# Patient Record
Sex: FEMALE | Race: WHITE | NOT HISPANIC OR LATINO | Employment: UNEMPLOYED | URBAN - METROPOLITAN AREA
[De-identification: names, ages, dates, MRNs, and addresses within clinical notes are randomized per-mention and may not be internally consistent; named-entity substitution may affect disease eponyms.]

---

## 2020-02-13 ENCOUNTER — HOSPITAL ENCOUNTER (EMERGENCY)
Facility: MEDICAL CENTER | Age: 25
End: 2020-02-13
Payer: MEDICAID

## 2020-02-13 VITALS
SYSTOLIC BLOOD PRESSURE: 139 MMHG | WEIGHT: 266.76 LBS | HEIGHT: 62 IN | BODY MASS INDEX: 49.09 KG/M2 | RESPIRATION RATE: 18 BRPM | OXYGEN SATURATION: 99 % | DIASTOLIC BLOOD PRESSURE: 99 MMHG | HEART RATE: 118 BPM | TEMPERATURE: 96.4 F

## 2020-02-13 PROCEDURE — 302449 STATCHG TRIAGE ONLY (STATISTIC)

## 2020-02-14 NOTE — ED NOTES
Patient expressed desire to leave AMA. She plans on returning tomorrow. Discussed risks of leaving and pt sign AMA form. Will dismiss.

## 2020-02-14 NOTE — ED TRIAGE NOTES
Chief Complaint   Patient presents with   • Abdominal Pain     c/o low abd pain x 3 hrs, hx of ovarian cysts. + n/v x 4 today. Denies diarrhea. Pt reports difficulty urinating, denies vaginal discharge or bleeding.      Pt ambulatory to triage foe above complaints. Instructed on clean catch urine collection. Educated on triage process, encouraged to inform staff off any changes.

## 2020-04-21 ENCOUNTER — APPOINTMENT (OUTPATIENT)
Dept: ADMISSIONS | Facility: MEDICAL CENTER | Age: 25
End: 2020-04-21
Payer: MEDICAID

## 2020-04-23 NOTE — OR NURSING
1. Caller Name: Mely Kellogg                        Call Back Number: [id number]  [facility] PCP or Specialty Provider: No            2.  Does patient have any active symptoms of respiratory illness (fever OR cough OR shortness of breath OR sore throat)? No.    3.  Does patient have any comoribidities? None     4.  Has the patient traveled in the last 14 days OR had any known contact with someone who is suspected or confirmed to have COVID-19?  No.    5. Disposition: Cleared by RN Triage; OK to keep/schedule appointment    Note routed to [facility] Provider: ARIANNA only.

## 2020-04-24 ENCOUNTER — HOSPITAL ENCOUNTER (OUTPATIENT)
Facility: MEDICAL CENTER | Age: 25
End: 2020-04-24
Attending: OBSTETRICS & GYNECOLOGY | Admitting: OBSTETRICS & GYNECOLOGY
Payer: MEDICAID

## 2020-04-24 ENCOUNTER — ANESTHESIA EVENT (OUTPATIENT)
Dept: SURGERY | Facility: MEDICAL CENTER | Age: 25
End: 2020-04-24
Payer: MEDICAID

## 2020-04-24 ENCOUNTER — ANESTHESIA (OUTPATIENT)
Dept: SURGERY | Facility: MEDICAL CENTER | Age: 25
End: 2020-04-24
Payer: MEDICAID

## 2020-04-24 VITALS
RESPIRATION RATE: 18 BRPM | WEIGHT: 257.94 LBS | HEART RATE: 100 BPM | DIASTOLIC BLOOD PRESSURE: 61 MMHG | TEMPERATURE: 98.1 F | HEIGHT: 57 IN | SYSTOLIC BLOOD PRESSURE: 112 MMHG | OXYGEN SATURATION: 96 % | BODY MASS INDEX: 55.65 KG/M2

## 2020-04-24 DIAGNOSIS — G89.18 POSTOPERATIVE PAIN: ICD-10-CM

## 2020-04-24 LAB
EKG IMPRESSION: NORMAL
PATHOLOGY CONSULT NOTE: NORMAL

## 2020-04-24 PROCEDURE — 500868 HCHG NEEDLE, SURGI(VARES): Performed by: OBSTETRICS & GYNECOLOGY

## 2020-04-24 PROCEDURE — 160035 HCHG PACU - 1ST 60 MINS PHASE I: Performed by: OBSTETRICS & GYNECOLOGY

## 2020-04-24 PROCEDURE — 88305 TISSUE EXAM BY PATHOLOGIST: CPT | Mod: 59

## 2020-04-24 PROCEDURE — 700101 HCHG RX REV CODE 250: Performed by: ANESTHESIOLOGY

## 2020-04-24 PROCEDURE — 700105 HCHG RX REV CODE 258: Performed by: OBSTETRICS & GYNECOLOGY

## 2020-04-24 PROCEDURE — 700101 HCHG RX REV CODE 250: Performed by: OBSTETRICS & GYNECOLOGY

## 2020-04-24 PROCEDURE — 93005 ELECTROCARDIOGRAM TRACING: CPT | Performed by: OBSTETRICS & GYNECOLOGY

## 2020-04-24 PROCEDURE — 502779 HCHG SUTURE, QUILL: Performed by: OBSTETRICS & GYNECOLOGY

## 2020-04-24 PROCEDURE — A9270 NON-COVERED ITEM OR SERVICE: HCPCS | Performed by: ANESTHESIOLOGY

## 2020-04-24 PROCEDURE — 160046 HCHG PACU - 1ST 60 MINS PHASE II: Performed by: OBSTETRICS & GYNECOLOGY

## 2020-04-24 PROCEDURE — 160036 HCHG PACU - EA ADDL 30 MINS PHASE I: Performed by: OBSTETRICS & GYNECOLOGY

## 2020-04-24 PROCEDURE — 700111 HCHG RX REV CODE 636 W/ 250 OVERRIDE (IP): Performed by: ANESTHESIOLOGY

## 2020-04-24 PROCEDURE — 93010 ELECTROCARDIOGRAM REPORT: CPT | Performed by: INTERNAL MEDICINE

## 2020-04-24 PROCEDURE — 700102 HCHG RX REV CODE 250 W/ 637 OVERRIDE(OP): Performed by: ANESTHESIOLOGY

## 2020-04-24 PROCEDURE — 700102 HCHG RX REV CODE 250 W/ 637 OVERRIDE(OP): Performed by: OBSTETRICS & GYNECOLOGY

## 2020-04-24 PROCEDURE — 160002 HCHG RECOVERY MINUTES (STAT): Performed by: OBSTETRICS & GYNECOLOGY

## 2020-04-24 PROCEDURE — 502714 HCHG ROBOTIC SURGERY SERVICES: Performed by: OBSTETRICS & GYNECOLOGY

## 2020-04-24 PROCEDURE — 160009 HCHG ANES TIME/MIN: Performed by: OBSTETRICS & GYNECOLOGY

## 2020-04-24 PROCEDURE — A9270 NON-COVERED ITEM OR SERVICE: HCPCS | Performed by: OBSTETRICS & GYNECOLOGY

## 2020-04-24 PROCEDURE — 500854 HCHG NEEDLE, INSUFFLATION FOR STEP: Performed by: OBSTETRICS & GYNECOLOGY

## 2020-04-24 PROCEDURE — 88112 CYTOPATH CELL ENHANCE TECH: CPT

## 2020-04-24 PROCEDURE — 160042 HCHG SURGERY MINUTES - EA ADDL 1 MIN LEVEL 5: Performed by: OBSTETRICS & GYNECOLOGY

## 2020-04-24 PROCEDURE — 160025 RECOVERY II MINUTES (STATS): Performed by: OBSTETRICS & GYNECOLOGY

## 2020-04-24 PROCEDURE — 501838 HCHG SUTURE GENERAL: Performed by: OBSTETRICS & GYNECOLOGY

## 2020-04-24 PROCEDURE — 160031 HCHG SURGERY MINUTES - 1ST 30 MINS LEVEL 5: Performed by: OBSTETRICS & GYNECOLOGY

## 2020-04-24 PROCEDURE — 160048 HCHG OR STATISTICAL LEVEL 1-5: Performed by: OBSTETRICS & GYNECOLOGY

## 2020-04-24 RX ORDER — ACETAMINOPHEN 500 MG
1000 TABLET ORAL ONCE
Status: COMPLETED | OUTPATIENT
Start: 2020-04-24 | End: 2020-04-24

## 2020-04-24 RX ORDER — LORAZEPAM 2 MG/ML
0.5 INJECTION INTRAMUSCULAR
Status: DISCONTINUED | OUTPATIENT
Start: 2020-04-24 | End: 2020-04-24 | Stop reason: HOSPADM

## 2020-04-24 RX ORDER — DIPHENHYDRAMINE HYDROCHLORIDE 50 MG/ML
12.5 INJECTION INTRAMUSCULAR; INTRAVENOUS
Status: DISCONTINUED | OUTPATIENT
Start: 2020-04-24 | End: 2020-04-24 | Stop reason: HOSPADM

## 2020-04-24 RX ORDER — GABAPENTIN 300 MG/1
600 CAPSULE ORAL ONCE
Status: COMPLETED | OUTPATIENT
Start: 2020-04-24 | End: 2020-04-24

## 2020-04-24 RX ORDER — LIDOCAINE HYDROCHLORIDE 20 MG/ML
INJECTION, SOLUTION EPIDURAL; INFILTRATION; INTRACAUDAL; PERINEURAL PRN
Status: DISCONTINUED | OUTPATIENT
Start: 2020-04-24 | End: 2020-04-24 | Stop reason: SURG

## 2020-04-24 RX ORDER — OXYCODONE HCL 5 MG/5 ML
10 SOLUTION, ORAL ORAL
Status: COMPLETED | OUTPATIENT
Start: 2020-04-24 | End: 2020-04-24

## 2020-04-24 RX ORDER — BUPIVACAINE HYDROCHLORIDE AND EPINEPHRINE 2.5; 5 MG/ML; UG/ML
INJECTION, SOLUTION EPIDURAL; INFILTRATION; INTRACAUDAL; PERINEURAL
Status: DISCONTINUED | OUTPATIENT
Start: 2020-04-24 | End: 2020-04-24 | Stop reason: HOSPADM

## 2020-04-24 RX ORDER — HYDRALAZINE HYDROCHLORIDE 20 MG/ML
5 INJECTION INTRAMUSCULAR; INTRAVENOUS
Status: DISCONTINUED | OUTPATIENT
Start: 2020-04-24 | End: 2020-04-24 | Stop reason: HOSPADM

## 2020-04-24 RX ORDER — LABETALOL HYDROCHLORIDE 5 MG/ML
5 INJECTION, SOLUTION INTRAVENOUS
Status: DISCONTINUED | OUTPATIENT
Start: 2020-04-24 | End: 2020-04-24 | Stop reason: HOSPADM

## 2020-04-24 RX ORDER — DEXAMETHASONE SODIUM PHOSPHATE 4 MG/ML
INJECTION, SOLUTION INTRA-ARTICULAR; INTRALESIONAL; INTRAMUSCULAR; INTRAVENOUS; SOFT TISSUE PRN
Status: DISCONTINUED | OUTPATIENT
Start: 2020-04-24 | End: 2020-04-24 | Stop reason: SURG

## 2020-04-24 RX ORDER — ONDANSETRON 4 MG/1
4 TABLET, FILM COATED ORAL EVERY 4 HOURS PRN
Qty: 20 TAB | Refills: 0
Start: 2020-04-24

## 2020-04-24 RX ORDER — HYDROMORPHONE HYDROCHLORIDE 1 MG/ML
0.4 INJECTION, SOLUTION INTRAMUSCULAR; INTRAVENOUS; SUBCUTANEOUS
Status: DISCONTINUED | OUTPATIENT
Start: 2020-04-24 | End: 2020-04-24 | Stop reason: HOSPADM

## 2020-04-24 RX ORDER — MAGNESIUM HYDROXIDE 1200 MG/15ML
LIQUID ORAL
Status: DISCONTINUED | OUTPATIENT
Start: 2020-04-24 | End: 2020-04-24 | Stop reason: HOSPADM

## 2020-04-24 RX ORDER — HYDROMORPHONE HYDROCHLORIDE 1 MG/ML
0.2 INJECTION, SOLUTION INTRAMUSCULAR; INTRAVENOUS; SUBCUTANEOUS
Status: DISCONTINUED | OUTPATIENT
Start: 2020-04-24 | End: 2020-04-24 | Stop reason: HOSPADM

## 2020-04-24 RX ORDER — SODIUM CHLORIDE, SODIUM LACTATE, POTASSIUM CHLORIDE, CALCIUM CHLORIDE 600; 310; 30; 20 MG/100ML; MG/100ML; MG/100ML; MG/100ML
INJECTION, SOLUTION INTRAVENOUS CONTINUOUS
Status: DISCONTINUED | OUTPATIENT
Start: 2020-04-24 | End: 2020-04-24 | Stop reason: HOSPADM

## 2020-04-24 RX ORDER — SUCCINYLCHOLINE/SOD CL,ISO/PF 200MG/10ML
SYRINGE (ML) INTRAVENOUS PRN
Status: DISCONTINUED | OUTPATIENT
Start: 2020-04-24 | End: 2020-04-24 | Stop reason: SURG

## 2020-04-24 RX ORDER — MEPERIDINE HYDROCHLORIDE 25 MG/ML
6.25 INJECTION INTRAMUSCULAR; INTRAVENOUS; SUBCUTANEOUS
Status: DISCONTINUED | OUTPATIENT
Start: 2020-04-24 | End: 2020-04-24 | Stop reason: HOSPADM

## 2020-04-24 RX ORDER — ROCURONIUM BROMIDE 10 MG/ML
INJECTION, SOLUTION INTRAVENOUS PRN
Status: DISCONTINUED | OUTPATIENT
Start: 2020-04-24 | End: 2020-04-24 | Stop reason: SURG

## 2020-04-24 RX ORDER — CELECOXIB 200 MG/1
400 CAPSULE ORAL ONCE
Status: COMPLETED | OUTPATIENT
Start: 2020-04-24 | End: 2020-04-24

## 2020-04-24 RX ORDER — SODIUM CHLORIDE, SODIUM LACTATE, POTASSIUM CHLORIDE, CALCIUM CHLORIDE 600; 310; 30; 20 MG/100ML; MG/100ML; MG/100ML; MG/100ML
INJECTION, SOLUTION INTRAVENOUS
Status: COMPLETED | OUTPATIENT
Start: 2020-04-24 | End: 2020-04-24

## 2020-04-24 RX ORDER — OXYCODONE HYDROCHLORIDE AND ACETAMINOPHEN 5; 325 MG/1; MG/1
1-2 TABLET ORAL EVERY 4 HOURS PRN
Qty: 28 TAB | Refills: 0
Start: 2020-04-24 | End: 2020-05-01

## 2020-04-24 RX ORDER — HALOPERIDOL 5 MG/ML
1 INJECTION INTRAMUSCULAR
Status: DISCONTINUED | OUTPATIENT
Start: 2020-04-24 | End: 2020-04-24 | Stop reason: HOSPADM

## 2020-04-24 RX ORDER — OXYCODONE HCL 5 MG/5 ML
5 SOLUTION, ORAL ORAL
Status: COMPLETED | OUTPATIENT
Start: 2020-04-24 | End: 2020-04-24

## 2020-04-24 RX ORDER — HYDROMORPHONE HYDROCHLORIDE 1 MG/ML
0.1 INJECTION, SOLUTION INTRAMUSCULAR; INTRAVENOUS; SUBCUTANEOUS
Status: DISCONTINUED | OUTPATIENT
Start: 2020-04-24 | End: 2020-04-24 | Stop reason: HOSPADM

## 2020-04-24 RX ORDER — DEXMEDETOMIDINE HYDROCHLORIDE 100 UG/ML
INJECTION, SOLUTION INTRAVENOUS PRN
Status: DISCONTINUED | OUTPATIENT
Start: 2020-04-24 | End: 2020-04-24 | Stop reason: SURG

## 2020-04-24 RX ORDER — ONDANSETRON 2 MG/ML
INJECTION INTRAMUSCULAR; INTRAVENOUS PRN
Status: DISCONTINUED | OUTPATIENT
Start: 2020-04-24 | End: 2020-04-24 | Stop reason: SURG

## 2020-04-24 RX ORDER — ONDANSETRON 2 MG/ML
4 INJECTION INTRAMUSCULAR; INTRAVENOUS
Status: DISCONTINUED | OUTPATIENT
Start: 2020-04-24 | End: 2020-04-24 | Stop reason: HOSPADM

## 2020-04-24 RX ORDER — HYDROMORPHONE HYDROCHLORIDE 2 MG/ML
INJECTION, SOLUTION INTRAMUSCULAR; INTRAVENOUS; SUBCUTANEOUS PRN
Status: DISCONTINUED | OUTPATIENT
Start: 2020-04-24 | End: 2020-04-24 | Stop reason: SURG

## 2020-04-24 RX ORDER — MIDAZOLAM HYDROCHLORIDE 1 MG/ML
INJECTION INTRAMUSCULAR; INTRAVENOUS PRN
Status: DISCONTINUED | OUTPATIENT
Start: 2020-04-24 | End: 2020-04-24 | Stop reason: SURG

## 2020-04-24 RX ADMIN — MIDAZOLAM HYDROCHLORIDE 2 MG: 1 INJECTION, SOLUTION INTRAMUSCULAR; INTRAVENOUS at 07:48

## 2020-04-24 RX ADMIN — GABAPENTIN 600 MG: 300 CAPSULE ORAL at 06:25

## 2020-04-24 RX ADMIN — LIDOCAINE HYDROCHLORIDE 100 MG: 20 INJECTION, SOLUTION EPIDURAL; INFILTRATION; INTRACAUDAL; PERINEURAL at 10:20

## 2020-04-24 RX ADMIN — OXYCODONE HYDROCHLORIDE 10 MG: 5 SOLUTION ORAL at 11:44

## 2020-04-24 RX ADMIN — FENTANYL CITRATE 50 MCG: 50 INJECTION, SOLUTION INTRAMUSCULAR; INTRAVENOUS at 10:07

## 2020-04-24 RX ADMIN — ROCURONIUM BROMIDE 10 MG: 10 INJECTION, SOLUTION INTRAVENOUS at 09:32

## 2020-04-24 RX ADMIN — LIDOCAINE HYDROCHLORIDE 100 MG: 20 INJECTION, SOLUTION EPIDURAL; INFILTRATION; INTRACAUDAL; PERINEURAL at 07:52

## 2020-04-24 RX ADMIN — ROCURONIUM BROMIDE 20 MG: 10 INJECTION, SOLUTION INTRAVENOUS at 08:10

## 2020-04-24 RX ADMIN — ONDANSETRON 4 MG: 2 INJECTION INTRAMUSCULAR; INTRAVENOUS at 08:00

## 2020-04-24 RX ADMIN — HYDROMORPHONE HYDROCHLORIDE 400 MCG: 2 INJECTION, SOLUTION INTRAMUSCULAR; INTRAVENOUS; SUBCUTANEOUS at 10:09

## 2020-04-24 RX ADMIN — FENTANYL CITRATE 50 MCG: 50 INJECTION, SOLUTION INTRAMUSCULAR; INTRAVENOUS at 09:32

## 2020-04-24 RX ADMIN — PROPOFOL 200 MG: 10 INJECTION, EMULSION INTRAVENOUS at 07:52

## 2020-04-24 RX ADMIN — FENTANYL CITRATE 50 MCG: 50 INJECTION, SOLUTION INTRAMUSCULAR; INTRAVENOUS at 08:25

## 2020-04-24 RX ADMIN — DEXAMETHASONE SODIUM PHOSPHATE 8 MG: 4 INJECTION, SOLUTION INTRA-ARTICULAR; INTRALESIONAL; INTRAMUSCULAR; INTRAVENOUS; SOFT TISSUE at 08:00

## 2020-04-24 RX ADMIN — SODIUM CHLORIDE, POTASSIUM CHLORIDE, SODIUM LACTATE AND CALCIUM CHLORIDE: 600; 310; 30; 20 INJECTION, SOLUTION INTRAVENOUS at 06:50

## 2020-04-24 RX ADMIN — FENTANYL CITRATE 100 MCG: 50 INJECTION, SOLUTION INTRAMUSCULAR; INTRAVENOUS at 07:50

## 2020-04-24 RX ADMIN — ROCURONIUM BROMIDE 30 MG: 10 INJECTION, SOLUTION INTRAVENOUS at 07:52

## 2020-04-24 RX ADMIN — ACETAMINOPHEN 1000 MG: 500 TABLET ORAL at 06:23

## 2020-04-24 RX ADMIN — SUGAMMADEX 200 MG: 100 INJECTION, SOLUTION INTRAVENOUS at 10:12

## 2020-04-24 RX ADMIN — CELECOXIB 400 MG: 200 CAPSULE ORAL at 06:25

## 2020-04-24 RX ADMIN — Medication 120 MG: at 07:52

## 2020-04-24 RX ADMIN — DEXMEDETOMIDINE HYDROCHLORIDE 30 MCG: 100 INJECTION, SOLUTION INTRAVENOUS at 08:10

## 2020-04-24 RX ADMIN — POVIDONE-IODINE 15 ML: 10 SOLUTION TOPICAL at 06:15

## 2020-04-24 RX ADMIN — CEFOTETAN DISODIUM 2 G: 2 INJECTION, POWDER, FOR SOLUTION INTRAMUSCULAR; INTRAVENOUS at 08:00

## 2020-04-24 ASSESSMENT — FIBROSIS 4 INDEX: FIB4 SCORE: 0.2

## 2020-04-24 ASSESSMENT — PAIN SCALES - GENERAL: PAIN_LEVEL: 0

## 2020-04-24 NOTE — OP REPORT
Operative Report    Date: 4/24/2020    Surgeon: Ruby Roth M.D.     Assistant: HAMILTON Stephenson    Anesthesiologist: Dr. Risa Gage    Pre-operative Diagnosis:   Complex pelvic mass  Elevated CA-125 and LDH  Pelvic pain    Post-operative Diagnosis:   Left ovarian mass  Probable endometrioma  Elevated CA-125 and LDH  Pelvic pain    Procedure:   Robotic extensive lysis of adhesions  Robotic left salpingo-oophorectomy  Colpotomy for specimen retrieval    Indications: This is a very pleasant 25-year-old female with history of known ovarian cyst since at least 2016.  This was initially identified on obstetric ultrasound at the time of her last pregnancy.  She continued to follow-up, however reports that the cyst was not significantly enlarged over the last 2 years.  She then presented to the [facility] emergency department in August 2019 with a self-limited episode of pelvic pain.  A CT scan performed 8/4/2019 revealed a 14 x 9 x 15 cm left adnexal cystic mass.  Her intially symptoms resolved however recurred with severe pelvic pain in February 2020.  She then returned to the [facility] emergency room room with CT scan revealing normal upper abdomen with left adnexal fluid-filled septated mass measuring 17 x 10 x 14 cm with normal-appearing uterus and a smaller 3 cm right adnexal cyst on 2/15/2020.  The patient was evaluated by her gynecologist and tumor marker levels were noted to be elevated, including Ca1 25 of 157 and LDH of 327 with normal AFP CEA and hCG.  A repeat Ca1 25 performed 4/23/2020 revealed normalization to 23.9. Patient was counseled regarding options for management and robotic ovarian cystectomy versus unilateral salpingo-oophorectomy with possible staging was recommended. Patient was counseled regarding all risks, benefits and alternatives including but not limited to infection, bleeding up to and requiring a transfusion, damage to surrounding organs including bowel, bladder, and ureter,  pain, scarring, thromboembolism, and risks of anesthesia. Patient voiced understanding and desired to proceed. Informed consent was obtained.      Findings:   1. Normal-appearing upper abdomen to include the liver edge, diaphragm, omentum, and small bowel   2. Adhesions between the left ovary and omentum, small bowel, small bowel mesentery, anterior pelvic peritoneum, bladder, sigmoid colon, right fallopian tube and right pelvic sidewall   3. Enlarged 18 week sized left ovarian mass with tubal torsion about the uteroovarian ligament, no intrabdominal spill  4. Contained decompression within a bag yielded chocolate cyst fluid consistent with probable endometrioma  5. 8 week sized uterus with normal-appearing left adnexa  6. Smooth abdominal pelvic and cul-de-sac peritoneal surfaces with no evidence of tumor implant or seeding  7.   Procedure in detail: The patient was identified in the pre-operative holding area and brought to the operating room. Correct side and site were identified. Pre-operative antibiotics of were administered prior to the procedure. GETA was smoothly induced. The patient was prepped and draped in the usual sterile fashion.  A Hill catheter was placed on the field.     An 8 mm supraumbilical incision was made and a Veress needle placed with confirmation of intra-abdominal position and low initial insufflation pressure.  The abdomen was insufflated with carbon dioxide gas to 15 mmHg.  The 8 millimeter robotic trocar was then placed.  The camera was inserted and a survey of the abdomen and pelvis was performed with findings as noted above.  The patient was placed in steep Trendelenburg position. A left upper quadrant 8 mm robotic port was then placed under direct visualization in the usual manner 8 cm lateral to the umbilical port, followed by a 5 mm assistant port in the left lower quadrant.  In the right upper quadrant 2 additional ports were placed in a similar manner.  The robot was brought to  the patient bedside and docked following targeting.  The robotic instruments were placed. The surgeon scrubbed out to attend the console.    Significant adhesive disease was encountered in the mass and the surrounding abdominal pelvic structures.  This required extensive adhesio lysis to free the mass of which greater than 50% of the case was spent completing.  We began anteriorly with excision of adhesions between the mass, omentum, and anterior abdominal wall.  Laterally on the left side the mass was adherent to the omentum and the sigmoid colon.  Inferiorly the mass was adherent to the small bowel and small bowel mesentery.  Distally the mass was adherent to the bladder and anterior pelvic peritoneum.  And laterally on the right the mass was adherent to the right fallopian tube and right pelvic sidewall peritoneum.  Blunt and sharp dissection in combination with monopolar cautery were used to free the adhesions surrounding the majority of the left ovarian mass.  Additional loops of small bowel and sigmoid epiploica were adherent within the anterior pelvic cul-de-sac.  Filmy adhesions were released at which time the underlying uterus and contralateral adnexa were visible.  The mass was able to be completely visualized and was noted to arise from the left ovary.  The surface was smooth with no excrescences, nodularity, or hypervascularity concerning for a malignant process.  There was torsion of the left fallopian tube about the utero-ovarian ligament.    The left utero-ovarian pedicle was then grasped and the uterus tented superiorly.  Physiologic adhesions between the left colon and the left pelvic sidewall were taken down with electrocautery.  The left broad ligament peritoneum lateral to the IP ligament was then incised parallel and the left retroperitoneal space entered.  The ureter was identified on the medial leaf of the broad ligament and the left ovarian vessels were isolated.  The ovarian pedicle was  triply cauterized and transected.  The posterior leaf of the broad ligament peritoneum was then incised medially to the utero-ovarian ligament.  The utero-ovarian ligament was then skeletonized, cauterized and transected.  At this point in time the mass was nearly completely freed aside from residual adhesions between the right fallopian tube and pelvic sidewall.  These were sharply and bluntly , and the mass was placed into the right upper abdomen.  The mass remained intact throughout the course of the dissection with no evidence of intra-abdominal spill.      We then turned our attention to the pelvis where in monopolar cautery was used to create a colpotomy in the midline just below the cervix.  The colpotomy was extended on either side and a 17 cm Fercho bag was brought through the vagina.  The bag was positioned in the midline pelvis and the mass was placed within.  The bag was then retrieved vaginally and brought to the introitus.  A weighted speculum was placed within the bag to protect the posterior vagina, and a right angle retractor was placed in a similar manner anteriorly.  The mass was then sharply incised and drained with extrusion of copious chocolate cyst fluid.  The mass was then able to be further decompressed and delivered through the vagina.  The posterior colpotomy was then repaired with a 0 Quill suture in a double layer running fashion.  Excellent hemostasis was noted at this time.  Pelvic washings were performed. The abdomen and pelvis were copiously irrigated and excellent hemostasis was noted at this time.       The robot was undocked.  The robotic instruments were withdrawn and the abdomen desufflated at which time the ports were withdrawn.  The port sites were irrigated and the overlying skin reapproximated with 3-0 Monocryl suture in a subcuticular fashion.  Dressings were applied.  Patient tolerated the procedure well.  The patient was awakened from general anesthetic, and was  taken to the recovery room in stable condition. Sponge and needle counts were correct at the end of the case.     Specimen: Left fallopian tube and ovary, pelvic washings for permanent specimen    EBL: 50 cc    Dispo: stable, extubated, to PACU

## 2020-04-24 NOTE — DISCHARGE INSTRUCTIONS
ACTIVITY: Rest and take it easy for the first 24 hours.  A responsible adult is recommended to remain with you during that time.  It is normal to feel sleepy.  We encourage you to not do anything that requires balance, judgment or coordination.    MILD FLU-LIKE SYMPTOMS ARE NORMAL. YOU MAY EXPERIENCE GENERALIZED MUSCLE ACHES, THROAT IRRITATION, HEADACHE AND/OR SOME NAUSEA.    FOR 24 HOURS DO NOT:  Drive, operate machinery or run household appliances.  Drink beer or alcoholic beverages.   Make important decisions or sign legal documents.    SPECIAL INSTRUCTIONS:  1. No heavy lifting greater than 10 pounds for minimum 6 weeks   2. Nothing in vagina (ie no tampons, douching, intercourse) for minimum of 6 weeks   3. No driving while taking narcotics   4. Return to our office as directed and call to confirm appointment Call our office [phone] if you develop any fevers, chills, nausea/vomiting, heavy vaginal bleeding, or redness, tenderness, and/or drainage from your wound, if you have persistent watery discharge while ambulating or stool draining from the vagina .   5. Showering is ok after shower make sure wound is dry.   6. You may keep the wound dressing and change everyday. After 2 weeks from surgery you may keep the wound dressing off.   7. You may have vaginal spotting or light bleeding which is normal.   8. If you have not had a bowel movement for 2 days, please take over the counter Milk of Magnesium, 1 tablespoon every 4 hours. After 4 doses and if you still have not had a bowel movement, please call your doctor.   9. You may eat soft diet, such as soup, liquid, for day #1 and if tolerating you may resume your regular diet.    DIET: To avoid nausea, slowly advance diet as tolerated, avoiding spicy or greasy foods for the first day.  Add more substantial food to your diet according to your physician's instructions.  Babies can be fed formula or breast milk as soon as they are hungry.  INCREASE FLUIDS AND  FIBER TO AVOID CONSTIPATION.    SURGICAL DRESSING/BATHING: see special instructions above.    FOLLOW-UP APPOINTMENT:  A follow-up appointment should be arranged with your doctor in 2 weeks call to schedule.    You should CALL YOUR PHYSICIAN if you develop:  Fever greater than 101 degrees F.  Pain not relieved by medication, or persistent nausea or vomiting.  Excessive bleeding (blood soaking through dressing) or unexpected drainage from the wound.  Extreme redness or swelling around the incision site, drainage of pus or foul smelling drainage.  Inability to urinate or empty your bladder within 8 hours.  Problems with breathing or chest pain.    You should call 911 if you develop problems with breathing or chest pain.  If you are unable to contact your doctor or surgical center, you should go to the nearest emergency room or urgent care center.    Physician's telephone #: [phone].    If any questions arise, call your doctor.  If your doctor is not available, please feel free to call the Surgical Center at {Surgical Dept Numbers:[id number]}.  The Center is open Monday through Friday from 7AM to 7PM.  You can also call the [facility] HOTLINE open 24 hours/day, 7 days/week and speak to a nurse at [phone], or toll free at [phone].    A registered nurse may call you a few days after your surgery to see how you are doing after your procedure.    MEDICATIONS: Resume taking daily medication.  Take prescribed pain medication with food.  If no medication is prescribed, you may take non-aspirin pain medication if needed.  PAIN MEDICATION CAN BE VERY CONSTIPATING.  Take a stool softener or laxative such as senokot, pericolace, or milk of magnesia if needed.     Prescription given for  ondansetron (Zofran)     oxyCODONE-acetaminophen (PERCOCET).   Last pain medication given at 11:44 am.    If your physician has prescribed pain medication that includes Acetaminophen (Tylenol), do not take additional Acetaminophen  (Tylenol) while taking the prescribed medication.    Depression / Suicide Risk    As you are discharged from this [facility] facility, it is important to learn how to keep safe from harming yourself.    Recognize the warning signs:  · Abrupt changes in personality, positive or negative- including increase in energy   · Giving away possessions  · Change in eating patterns- significant weight changes-  positive or negative  · Change in sleeping patterns- unable to sleep or sleeping all the time   · Unwillingness or inability to communicate  · Depression  · Unusual sadness, discouragement and loneliness  · Talk of wanting to die  · Neglect of personal appearance   · Rebelliousness- reckless behavior  · Withdrawal from people/activities they love  · Confusion- inability to concentrate     If you or a loved one observes any of these behaviors or has concerns about self-harm, here's what you can do:  · Talk about it- your feelings and reasons for harming yourself  · Remove any means that you might use to hurt yourself (examples: pills, rope, extension cords, firearm)  · Get professional help from the community (Mental Health, Substance Abuse, psychological counseling)  · Do not be alone:Call your Safe Contact- someone whom you trust who will be there for you.  · Call your local CRISIS HOTLINE 020-7829 or 041-010-3045  · Call your local Children's Mobile Crisis Response Team [place] (118) 794-2978 or www.[employer]  · Call the toll free National Suicide Prevention Hotlines   · National Suicide Prevention Lifeline 241-775-VLBP (7076)  · National Hope Line Network 800-SUICIDE (261-9001)

## 2020-04-24 NOTE — OR SURGEON
Immediate Post OP Note    PreOp Diagnosis:   Complex pelvic mass  Elevated CA-125 and LDH  Pelvic pain    PostOp Diagnosis:   Left ovarian mass  Probable endometrioma  Elevated CA-125 and LDH  Pelvic pain    Procedure(s):  LAPAROSCOPY, DIAGNOSTIC, ROBOT-ASSISTED, USING DA FRANKLYN XI-POSSIBLE - Wound Class: Clean Contaminated  SALPINGO-OOPHORECTOMY-LEFT - Wound Class: Clean Contaminated  LYSIS OF ADHESIONS    Surgeon(s):  BRENDA Lucas A.P.R.N.    Anesthesiologist/Type of Anesthesia:  Anesthesiologist: Loan Gage M.D./General     Surgical Staff:  Circulator: Linda Birmingham; Ricky Caruso R.N.  Relief Circulator: Sneha Wang R.N.  Scrub Person: Lita Edouard; Maru Khan    Specimens removed if any:  ID Type Source Tests Collected by Time Destination   A : LEFT OVARY AND TUBE Tissue Ovary PATHOLOGY SPECIMEN Ruby Roth M.D. 4/24/2020  9:33 AM    B : Pelvic washings  Other Other PATHOLOGY SPECIMEN Ruby Roth M.D. 4/24/2020 10:02 AM        Estimated Blood Loss: 50 cc    Findings:   1. Normal-appearing upper abdomen to include the liver edge, diaphragm, omentum, and small bowel   2. Adhesions between the left ovary and omentum, small bowel, small bowel mesentery, anterior pelvic peritoneum, bladder, sigmoid colon, right fallopian tube and right pelvic sidewall   3. Enlarged 18 week sized left ovarian mass with tubal torsion about the uteroovarian ligament, no intrabdominal spill  4. Contained decompression within a bag yielded chocolate cyst fluid consistent with probable endometrioma  5. 8 week sized uterus with normal-appearing left adnexa  6. Smooth abdominal pelvic and cul-de-sac peritoneal surfaces with no evidence of tumor implant or seeding       Complications: None        4/24/2020 10:32 AM Ruby Roth M.D.

## 2020-04-24 NOTE — OR NURSING
Pt arrived to floor from PACU. Pt A+Ox4, able to make needs known, PIV Patent and SL. Pain  3/10 to abd, dressing CDI with a scant amount of serous . CSMT's and HARI's WNL,  Ice pack applied.  Educated pt on Incentive Spirometer.    Pt ambulated and voided x1.  Pt's VSS; denies N/V; states pain is 3/10 but tolerable level. Dressing CDI to abd. D/c orders received. IV dc'd. Pt changed into clothing with assistance. Discharge instructions given; pt and family verbalized understanding and questions answered. Patient states ready to d/c home. Prescriptions at home. Pt  Completed phase 2.

## 2020-04-24 NOTE — ANESTHESIA PREPROCEDURE EVALUATION
24 yo F with BMI >40 and adenxal cystic mass requiring robotic salpingoophorectomy with possible staging.    Relevant Problems   No relevant active problems       Physical Exam    Airway   Mallampati: III  TM distance: <3 FB  Neck ROM: full       Cardiovascular - normal exam  Rhythm: regular  Rate: normal  (-) murmur     Dental - normal exam         Pulmonary - normal exam  Breath sounds clear to auscultation     Abdominal   (+) obese     Neurological - normal exam                 Anesthesia Plan    ASA 3   ASA physical status 3 criteria: morbid obesity - BMI greater than or equal to 40    Plan - general       Airway plan will be ETT        Induction: intravenous    Postoperative Plan: Postoperative administration of opioids is intended.    Pertinent diagnostic labs and testing reviewed    Informed Consent:    Anesthetic plan and risks discussed with patient.    Use of blood products discussed with: patient whom consented to blood products.

## 2020-04-24 NOTE — ANESTHESIA TIME REPORT
Anesthesia Start and Stop Event Times     Date Time Event    4/24/2020 0711 Ready for Procedure     0746 Anesthesia Start     1039 Anesthesia Stop        Responsible Staff  04/24/20    Name Role Begin End    Loan Gage M.D. Anesth 0746 1039        Preop Diagnosis (Free Text):  Pre-op Diagnosis     PELVIC MASS        Preop Diagnosis (Codes):    Post op Diagnosis  Ovarian mass, left      Premium Reason  Non-Premium    Comments:

## 2020-04-24 NOTE — OR NURSING
Patient is doing well in recovery. She was medicated for nausea and given pain medication oral. She has been very drowsy and has slept on and off during recovery. S. O. Updated and scripts were called to her pharmacy.

## 2020-04-24 NOTE — ANESTHESIA PROCEDURE NOTES
Airway  Date/Time: 4/24/2020 7:53 AM  Performed by: Loan Gage M.D.  Authorized by: Loan Gage M.D.     Location:  OR  Urgency:  Elective  Difficult Airway: No    Indications for Airway Management:  Anesthesia      Spontaneous Ventilation: absent    Sedation Level:  Deep  Preoxygenated: Yes    Patient Position:  Sniffing  Mask Difficulty Assessment:  0 - not attempted  Final Airway Type:  Endotracheal airway  Final Endotracheal Airway:  ETT  Cuffed: Yes    Technique Used for Successful ETT Placement:  Video laryngoscopy  Insertion Site:  Oral  Blade Type:  Glide  Laryngoscope Blade/Videolaryngoscope Blade Size:  3  ETT Size (mm):  6.5  Measured from:  Teeth  ETT to Teeth (cm):  21  Placement Verified by: auscultation and capnometry    Cormack-Lehane Classification:  Grade I - full view of glottis  Number of Attempts at Approach:  1  Ventilation Between Attempts:  None  Number of Other Approaches Attempted:  0   Very small mouth opening

## 2020-12-15 ENCOUNTER — NURSE TRIAGE (OUTPATIENT)
Dept: HEALTH INFORMATION MANAGEMENT | Facility: OTHER | Age: 25
End: 2020-12-15

## 2020-12-15 NOTE — TELEPHONE ENCOUNTER
"Possible exposer Covid - Aunt tested 12/14/20 - no results . Aunt took her to test 12/15/20. Mely has felt bad the last 3 to  4 days. Coughing 12/11/20 getting worse pain in the upper part  Back  around chest and shoulder blades. Throat extremely sore  Unable to drink water w / o  Pain. Cough  more day than night, can cause vomiting., white  To clear phlegm. Feels feverish .. hot ?. No Smell or taste since 12/12/20. Recommend UC or ER visit. Patient desires UC  to schedule at [facility] due to insurance if cannot be seen there will go to ER.    Reason for Disposition  • [1] Fever (or feeling feverish) OR cough AND [2] within 14 Days of COVID-19 EXPOSURE (Close Contact)    Additional Information  • Negative: SEVERE difficulty breathing (e.g., struggling for each breath, speak in single words, bluish lips)  • Negative: Sounds like a life-threatening emergency to the triager  • Negative: [1] Difficulty breathing occurs AND [2] within 14 days of COVID-19 EXPOSURE (Close Contact)  • Negative: Patient sounds very sick or weak to the triager    Answer Assessment - Initial Assessment Questions  1. CLOSE CONTACT: \"Who is the person with the confirmed or suspected COVID-19 infection that you were exposed to?\"      Aunt   2. PLACE of CONTACT: \"Where were you when you were exposed to COVID-19?\" (e.g., home, school, medical waiting room; which city?)      Home and car both masked   3. TYPE of CONTACT: \"How much contact was there?\" (e.g., sitting next to, live in same house, work in same office, same building)      Sitting next each other in the house and car  4. DURATION of CONTACT: \"How long were you in contact with the COVID-19 patient?\" (e.g., a few seconds, passed by person, a few minutes, live with the patient)      15 minutes  to an 1 hour on 12/15;20 when Aunt took to have Covid test [facility]    5. DATE of CONTACT: \"When did you have contact with a COVID-19 patient?\" (e.g., how " "many days ago)      Frequent exposure likely daily until 12/11/20 when Mely began to become ill.   6. TRAVEL: \"Have you traveled out of the country recently?\" If so, \"When and where?\"      * Also ask about out-of-state travel, since the [facility] has identified some high risk cities for community spread in the .      * Note: Travel becomes less relevant if there is widespread community transmission where the patient lives.      None  7. COMMUNITY SPREAD: \"Are there lots of cases of COVID-19 (community spread) where you live?\" (See public health department website, if unsure)    * MAJOR community spread: high number of cases; numbers of cases are increasing; many people hospitalized.    * MINOR community spread: low number of cases; not increasing; few or no people hospitalized      Major   8. SYMPTOMS: \"Do you have any symptoms?\" (e.g., fever, cough, breathing difficulty)      Sever sore throat, headache more toward the night and stay all night, cough  With pain in back shoulder blade area. Cough more in the day  Severe enought to cause vomiting - expectorates phlegm clear to white .. some blood because she thinks her throat is so raw very minor amount. Feels very warm and seems like it was high and it comes and goes. No way to check temp. No smell or taste - around the 12/12/20   9. PREGNANCY OR POSTPARTUM: \"Is there any chance you are pregnant?\" \"When was your last menstrual period?\" \"Did you deliver in the last 2 weeks?\"      No   10. HIGH RISK: \"Do you have any heart or lung problems? Do you have a weak immune system?\" (e.g., CHF, COPD, asthma, HIV positive, chemotherapy, renal failure, diabetes mellitus, sickle cell anemia)        None    Protocols used: CORONAVIRUS (COVID-19) EXPOSURE-A-OH      "

## 2022-07-12 ENCOUNTER — NON-PROVIDER VISIT (OUTPATIENT)
Dept: OCCUPATIONAL MEDICINE | Facility: CLINIC | Age: 27
End: 2022-07-12

## 2022-07-12 DIAGNOSIS — Z02.1 PRE-EMPLOYMENT HEALTH SCREENING EXAMINATION: ICD-10-CM

## 2022-07-12 DIAGNOSIS — Z02.1 PRE-EMPLOYMENT DRUG SCREENING: ICD-10-CM

## 2022-07-12 LAB
AMP AMPHETAMINE: NORMAL
COC COCAINE: NORMAL
INT CON NEG: NORMAL
INT CON POS: NORMAL
MET METHAMPHETAMINES: NORMAL
OPI OPIATES: NORMAL
PCP PHENCYCLIDINE: NORMAL
POC DRUG COMMENT 753798-OCCUPATIONAL HEALTH: NEGATIVE
THC: NORMAL

## 2022-07-12 PROCEDURE — 80305 DRUG TEST PRSMV DIR OPT OBS: CPT | Performed by: NURSE PRACTITIONER

## 2023-08-09 ENCOUNTER — APPOINTMENT (OUTPATIENT)
Dept: RADIOLOGY | Facility: MEDICAL CENTER | Age: 28
End: 2023-08-09
Attending: SURGERY
Payer: MEDICAID

## 2023-10-15 ENCOUNTER — APPOINTMENT (OUTPATIENT)
Dept: URGENT CARE | Facility: CLINIC | Age: 28
End: 2023-10-15

## (undated) DEVICE — SENSOR SPO2 NEO LNCS ADHESIVE (20/BX) SEE USER NOTES

## (undated) DEVICE — GLOVE SZ 6.5 BIOGEL PI MICRO - PF LF (50PR/BX)

## (undated) DEVICE — SUCTION TIP STRAIGHT ARGYLE - 50EA/CA

## (undated) DEVICE — NEPTUNE 4 PORT MANIFOLD - (20/PK)

## (undated) DEVICE — KIT ANESTHESIA W/CIRCUIT & 3/LT BAG W/FILTER (20EA/CA)

## (undated) DEVICE — OBTURATOR BLADELESS STANDARD 8MM (6EA/BX)

## (undated) DEVICE — NEEDLE DRIVER LARGE DA VINCI 10X'S REUSABLE

## (undated) DEVICE — GLOVE BIOGEL PI INDICATOR SZ 7.5 SURGICAL PF LF -(50/BX 4BX/CA)

## (undated) DEVICE — ARMREST CRADLE FOAM - (2PR/PK 12PR/CA)

## (undated) DEVICE — SET SUCTION/IRRIGATION WITH DISPOSABLE TIP (6/CA )PART #0250-070-520 IS A SUB

## (undated) DEVICE — GLOVE BIOGEL PI INDICATOR SZ 7.0 SURGICAL PF LF - (50/BX 4BX/CA)

## (undated) DEVICE — GOWN WARMING STANDARD FLEX - (30/CA)

## (undated) DEVICE — GOWN SURGEONS X-LARGE - DISP. (30/CA)

## (undated) DEVICE — SYSTEM CONTAINED EXTRACTION W/BALLOON BLUNT TIP TROCAR 17CM (1/EA)

## (undated) DEVICE — SUTURE 0 VICRYL PLUS CT-2 - 27 INCH (36/BX)

## (undated) DEVICE — NEEDLE INSFL 120MM 14GA VRRS - (20/BX)

## (undated) DEVICE — CHLORAPREP 26 ML APPLICATOR - ORANGE TINT(25/CA)

## (undated) DEVICE — LACTATED RINGERS INJ 1000 ML - (14EA/CA 60CA/PF)

## (undated) DEVICE — SET EXTENSION WITH 2 PORTS (48EA/CA) ***PART #2C8610 IS A SUBSTITUTE*****

## (undated) DEVICE — GLOVE BIOGEL PI INDICATOR SZ 6.5 SURGICAL PF LF - (50/BX 4BX/CA)

## (undated) DEVICE — PAD OR TABLE DA VINCI 2IN X 20IN X 72IN - (12EA/CA)

## (undated) DEVICE — SUCTION INSTRUMENT YANKAUER BULBOUS TIP W/O VENT (50EA/CA)

## (undated) DEVICE — BRIEF STRETCH MATERNITY M/L - FITS 20-60IN (5EA/BG 20BG/CA)

## (undated) DEVICE — SUTURE QUILL 0 PDO 14X14 - (12/BX)

## (undated) DEVICE — PACK GYN DAVINCI (2EA/CA)

## (undated) DEVICE — ELECTRODE DUAL RETURN W/ CORD - (50/PK)

## (undated) DEVICE — GLOVE SZ 7 BIOGEL PI MICRO - PF LF (50PR/BX 4BX/CA)

## (undated) DEVICE — ROBOTIC SURGERY SERVICES

## (undated) DEVICE — DETERGENT RENUZYME PLUS 10 OZ PACKET (50/BX)

## (undated) DEVICE — SUTURE 3-0 MONOCRYL PLUS PS-1 - 27 INCH (36/BX)

## (undated) DEVICE — TUBE CONNECT SUCTION CLEAR 120 X 1/4" (50EA/CA)"

## (undated) DEVICE — CANISTER SUCTION RIGID RED 1500CC (40EA/CA)

## (undated) DEVICE — SPATULA PERMANENT CAUTERY DA VINCI 10X'S REUSABLE

## (undated) DEVICE — NEEDLE INSUFFLATION LONG STEP (12EA/CA)

## (undated) DEVICE — PAD SANITARY 11IN MAXI IND WRAPPED  (12EA/PK 24PK/CA)

## (undated) DEVICE — SET LEADWIRE 5 LEAD BEDSIDE DISPOSABLE ECG (1SET OF 5/EA)

## (undated) DEVICE — DRAPE COLUMN  BOX OF 20

## (undated) DEVICE — SUTURE GENERAL

## (undated) DEVICE — SLEEVE, VASO, THIGH, MED

## (undated) DEVICE — SEAL 5MM-8MM UNIVERSAL  BOX OF 10

## (undated) DEVICE — DRAPE ARM  BOX OF 20

## (undated) DEVICE — SYRINGE 30 ML LS (56/BX)

## (undated) DEVICE — GLOVE SZ 7.5 BIOGEL PI MICRO - PF LF (50PR/BX)

## (undated) DEVICE — FORCEPS PROGRASP DA VINCI 10X'S REUSABLE

## (undated) DEVICE — TUBING CLEARLINK DUO-VENT - C-FLO (48EA/CA)

## (undated) DEVICE — ELECTRODE 850 FOAM ADHESIVE - HYDROGEL RADIOTRNSPRNT (50/PK)

## (undated) DEVICE — WATER IRRIGATION STERILE 1000ML (12EA/CA)

## (undated) DEVICE — CANISTER SUCTION 3000ML MECHANICAL FILTER AUTO SHUTOFF MEDI-VAC NONSTERILE LF DISP  (40EA/CA)

## (undated) DEVICE — NEEDLE DRIVER MEGA SUTURECUT DA VINCI 15X'S REUSABLE

## (undated) DEVICE — PACK TRENGUARD 450 PROCEDURE (12EA/CA)

## (undated) DEVICE — NEEDLE INSUFFLATION FOR STEP - (12/BX)

## (undated) DEVICE — MASK ANESTHESIA ADULT  - (100/CA)